# Patient Record
(demographics unavailable — no encounter records)

---

## 2024-11-12 NOTE — REASON FOR VISIT
[Symptom and Test Evaluation] : symptom and test evaluation [FreeTextEntry1] : 32 y/o F with PMH of PCOS, Hashimoto's thyroiditis, preDM2, Dyslipidemia (, Total Cholesterol 214), and DONNA, severe sleep apnea presents for initial cardiovascular work up   Patient noted in the she has been feeling not the same since te pandemic, and has gained weight;  No chest pain or shortness of breath, feels like he has slowed odwn and winded with stairs  Has had panic attacks before and none currently, and jese since starting Lexapro Some times standup quickly feels dizziness and lighthdedness   Family Hx: Stroke, paternal GM stroke and aneurysms  Paternal GF - DM 2 and obesity  Mother: Murmur and MVP Sister, 27 yrs and has murmur and MVP  Smoker: non smoking

## 2024-11-12 NOTE — ASSESSMENT
[FreeTextEntry1] : 32 y/o F with PMH of PCOS, Hashimoto's thyroiditis, preDM2, Dyslipidemia (, Total Cholesterol 214), and DONNA, severe sleep apnea presents for initial cardiovascular work up  1) Cardiovascular work up due to abnormal EKG  - no active chest pain or shortness of breath, but has been feeling tired and fatigued  - EKG reviewed Sinus Rhythm @ 87 bpm with normal axis WITHIN NORMAL LIMITS, but EKG done in the PMD office showed possible L atrial enlargement and so referred here  - based on this was also referred for sleep study and found to have severe Sleep apnea  - refer for TTE to assess LV function and valvulopathy  - refer for exercise stress test  - encourgae hydration  - Advised to partake in at least 150 mins/week of moderate intensity exercise like brisk walking. - blood pressure controlled during the visit   2) Dyslipidemia - Lifetime ASCVD Risk:    39%Optimal ASCVD Risk Calculator only provides optimal risk estimates for individuals 40 to 79 years of age. - refer for dietary and lifestyle modification   Kristi Cuevas D.O. [facility] Cardiology/Vascular Cardiology -[facility] Cardiology Telephone # [phone]